# Patient Record
Sex: MALE
[De-identification: names, ages, dates, MRNs, and addresses within clinical notes are randomized per-mention and may not be internally consistent; named-entity substitution may affect disease eponyms.]

---

## 2019-01-23 ENCOUNTER — HOSPITAL ENCOUNTER (EMERGENCY)
Dept: HOSPITAL 5 - ED | Age: 29
Discharge: HOME | End: 2019-01-23
Payer: SELF-PAY

## 2019-01-23 VITALS — DIASTOLIC BLOOD PRESSURE: 74 MMHG | SYSTOLIC BLOOD PRESSURE: 111 MMHG
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
## 2019-01-23 DIAGNOSIS — F17.200: ICD-10-CM
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
## 2019-01-23 DIAGNOSIS — F12.10: ICD-10-CM
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
## 2019-01-23 DIAGNOSIS — S43.401A: Primary | ICD-10-CM
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
